# Patient Record
Sex: MALE | Race: OTHER | Employment: FULL TIME | URBAN - METROPOLITAN AREA
[De-identification: names, ages, dates, MRNs, and addresses within clinical notes are randomized per-mention and may not be internally consistent; named-entity substitution may affect disease eponyms.]

---

## 2017-08-12 ENCOUNTER — HOSPITAL ENCOUNTER (EMERGENCY)
Age: 32
Discharge: HOME | End: 2017-08-12
Attending: FAMILY MEDICINE

## 2017-08-12 VITALS
SYSTOLIC BLOOD PRESSURE: 132 MMHG | DIASTOLIC BLOOD PRESSURE: 76 MMHG | HEIGHT: 66 IN | HEART RATE: 67 BPM | WEIGHT: 208 LBS | OXYGEN SATURATION: 96 % | RESPIRATION RATE: 16 BRPM | TEMPERATURE: 97.8 F | BODY MASS INDEX: 33.43 KG/M2

## 2017-08-12 DIAGNOSIS — S61.012A LACERATION OF THUMB, LEFT, INITIAL ENCOUNTER: Primary | ICD-10-CM

## 2017-08-12 RX ORDER — CEPHALEXIN 500 MG/1
500 CAPSULE ORAL 4 TIMES DAILY
Qty: 28 CAP | Refills: 0 | Status: SHIPPED | OUTPATIENT
Start: 2017-08-12 | End: 2017-08-19

## 2017-08-13 NOTE — DISCHARGE INSTRUCTIONS
Hansen en la mano cerrados con puntos: Instrucciones de cuidado - [ Cuts on the Hand Closed With Stitches: Care Instructions ]  Instrucciones de cuidado    Un brown en la mano puede ser en los dedos, el pulgar o la villanueva o el dorso de la [place]. A veces, un brown puede lesionar los tendones, los vasos sanguíneos o los nervios de la [place]. El médico utilizó puntos para cerrar el brown. Usar puntos también ayuda a que sane el brown y reduce la formación de cicatrices. Es posible que el médico le haya dado imani tablilla (férula) para ayudar a evitar que [employer] mano, los dedos o el pulgar. Si el brown fue profundo y a través de la piel, el médico [employer] capas de puntos. En la capa más profunda, se juntan las partes profundas del brown. Esos puntos se disolverán, y no es necesario quitarlos. Los puntos de la capa superior son los que ve en el brown. Es probable que le pongan imani venda. Será necesario que [facility] puntos, por lo general al cabo de 10 a 14 días. El médico puede sugerirle savage a un especialista de la mano si el brown es muy profundo, si tiene problemas para  los dedos o si tiene menos sensibilidad en la mano. El médico lo villarreal revisado cuidadosamente, sabina pueden aparecer [employer] tarde. Si observa algún problema o un síntoma nuevo, obtenga tratamiento médico de inmediato. La atención de seguimiento es imani parte clave de harris tratamiento y seguridad. Asegúrese de hacer y acudir a todas las citas, y llame a harris médico si está teniendo problemas. También es imani buena idea saber los resultados de sun exámenes y mantener imani lista de los medicamentos que berhane. ¿Cómo puede cuidarse en el hogar? · [address] primeras 24 a 48 horas. Después de esto, puede ducharse si harris médico lo aprueba. Seque el brown con toques suaves de toalla. · No sumerja el brown, javi, por ejemplo, en imani bañera. Harris médico le dirá cuándo es seguro mojar el brown.   · Si harris médico le dijo cómo cuidarse el brown, siga las instrucciones de sánchez médico. Si no le chayito instrucciones, siga estos consejos generales:  ¨ Después de las primeras 24 a 48 horas, lávese la avinash alrededor del brown con agua limpia 2 veces al día. No use peróxido de hidrógeno (agua [place]) ni alcohol, los cuales pueden retrasar la sanación. ¨ Puede cubrir el brown con imani capa delgada de vaselina y imani venda no adherente. ¨ Aplíquese más vaselina y [facility] la venda según sea necesario. · Mantenga elevada la mano adolorida sobre imani almohada en cualquier momento que se siente o se acueste emil los 3 días siguientes. Trate de mantenerla por encima del nivel del corazón. [place] ayudará a reducir la hinchazón. · Evite cualquier actividad que podría hacer que el brown se vuelva a abrir. · No se quite los puntos usted mismo. Sánchez médico le dirá cuándo regresar para [employer]. · Sea mendel con los medicamentos. [place] los analgésicos (medicamentos para el dolor) exactamente según lo indicado. ¨ Si el médico le recetó un analgésico, tómelo según las indicaciones. ¨ Si no está tomando un analgésico recetado, pregúntele a sánchez médico si puede laurence klever de [employer]. ¿Cuándo debe pedir ayuda? Llame a sánchez médico ahora mismo o busque atención médica inmediata si:  · Siente un dolor nuevo o el dolor empeora. · La piel cercana al brown está fría o pálida, o cambia de color. · Siente hormigueo, debilitamiento o entumecimiento cerca del brown. · El brown comienza a sangrar, y la kraig empapa la venda. Es normal que salgan pequeñas cantidades de Javi. · Tiene dificultades para  la avinash de la mano cercana al brown. · Tiene síntomas de infección, tales javi:  ¨ Aumento del dolor, la hinchazón, el enrojecimiento o la temperatura alrededor del brown. ¨ Vetas rojizas que salen del brown. ¨ Pus que sale del brown. Juan Shipley.   Preste especial atención a los cambios en sánchez anuja y asegúrese de comunicarse con sánchez médico si:  · No mejora javi se esperaba. ¿Dónde puede encontrar más información en inglés? [facility] a http://eliot-susie.info/. Escriba T250 en la búsqueda para aprender más acerca de \"Hansen en la mano cerrados con puntos: Instrucciones de cuidado - [ Cuts on the Hand Closed With Stitches: Care Instructions ]. \"  Revisado: 20 marzo, 2017  Versión del contenido: 11.3  © 6150-7852 [employer]. Las instrucciones de cuidado fueron adaptadas bajo licencia por [employer] (which disclaims liability or warranty for this information). Si usted tiene [place] afección médica o sobre estas instrucciones, siempre pregunte a sánchez profesional de anuja. Healthwise, Incorporated niega toda garantía o responsabilidad por sánchez uso de esta información.

## 2017-08-16 NOTE — UC PROVIDER NOTE
Patient is a 28 y.o. male presenting with skin laceration. The history is provided by the patient. Laceration    The incident occurred less than 1 hour ago. The laceration is located on the left hand (thumb ). The laceration is 2 cm in size. Injury mechanism: see nurse note. Foreign body present: no. The pain is mild. Pertinent negatives include no numbness. The patient's last tetanus shot was less than 5 years ago. History reviewed. No pertinent past medical history. History reviewed. No pertinent surgical history. History reviewed. No pertinent family history. Social History     Social History    Marital status:      Spouse name: N/A    Number of children: N/A    Years of education: N/A     Occupational History    Not on file. Social History Main Topics    Smoking status: Never Smoker    Smokeless tobacco: Never Used    Alcohol use Not on file    Drug use: Not on file    Sexual activity: Not on file     Other Topics Concern    Not on file     Social History Narrative                ALLERGIES: Review of patient's allergies indicates no known allergies. Review of Systems   Neurological: Negative for numbness. All other systems reviewed and are negative. Vitals:    08/12/17 1929   BP: 132/76   Pulse: 67   Resp: 16   Temp: 97.8 °F (36.6 °C)   SpO2: 96%   Weight: 94.3 kg (208 lb)   Height: 5' 6\" (1.676 m)       Physical Exam   Musculoskeletal:        Left hand: He exhibits tenderness and laceration. He exhibits normal range of motion, no bony tenderness and no swelling. Normal sensation noted. Normal strength noted.         Hands:      MDM     Differential Diagnosis; Clinical Impression; Plan:     CLINICAL IMPRESSION:  Laceration of thumb, left, initial encounter  (primary encounter diagnosis)      DDX    Plan:    Wound care  Keflex- motrin  Suture removal in 10 days  Amount and/or Complexity of Data Reviewed:    Review and summarize past medical records:  Yes  Risk of Significant Complications, Morbidity, and/or Mortality:   Presenting problems: Moderate  Management options: Moderate  Progress:   Patient progress:  Stable      Wound Repair  Date/Time: 8/12/2017 7:56 PM  Preparation: skin prepped with Shur-Clens  Location details: left thumb  Wound length:2.5 cm or less  Anesthesia: local infiltration    Anesthesia:  Local Anesthetic: lidocaine 1% with epinephrine  Anesthetic total: 5 mL  Irrigation solution: saline  Debridement: none  Skin closure: 5-0 nylon  Number of sutures: 5  Technique: simple  Approximation: close  Dressing: antibiotic ointment  Patient tolerance: Patient tolerated the procedure well with no immediate complications  My total time at bedside, performing this procedure was 16-30 minutes.